# Patient Record
Sex: FEMALE | Race: BLACK OR AFRICAN AMERICAN | NOT HISPANIC OR LATINO | Employment: FULL TIME | URBAN - METROPOLITAN AREA
[De-identification: names, ages, dates, MRNs, and addresses within clinical notes are randomized per-mention and may not be internally consistent; named-entity substitution may affect disease eponyms.]

---

## 2022-04-28 ENCOUNTER — HOSPITAL ENCOUNTER (OUTPATIENT)
Dept: ULTRASOUND IMAGING | Facility: CLINIC | Age: 62
Discharge: HOME | End: 2022-04-28
Attending: FAMILY MEDICINE | Admitting: FAMILY MEDICINE
Payer: COMMERCIAL

## 2022-04-28 DIAGNOSIS — M25.561 ACUTE PAIN OF RIGHT KNEE: ICD-10-CM

## 2022-04-28 PROCEDURE — 93971 EXTREMITY STUDY: CPT | Mod: RT

## 2024-01-20 ENCOUNTER — HEALTH MAINTENANCE LETTER (OUTPATIENT)
Age: 64
End: 2024-01-20

## 2025-01-26 ENCOUNTER — HEALTH MAINTENANCE LETTER (OUTPATIENT)
Age: 65
End: 2025-01-26

## 2025-05-31 ENCOUNTER — OFFICE VISIT (OUTPATIENT)
Dept: URGENT CARE | Facility: URGENT CARE | Age: 65
End: 2025-05-31
Payer: COMMERCIAL

## 2025-05-31 VITALS
SYSTOLIC BLOOD PRESSURE: 158 MMHG | DIASTOLIC BLOOD PRESSURE: 82 MMHG | BODY MASS INDEX: 51.54 KG/M2 | WEIGHT: 273 LBS | RESPIRATION RATE: 17 BRPM | HEIGHT: 61 IN | HEART RATE: 75 BPM | TEMPERATURE: 98.6 F

## 2025-05-31 DIAGNOSIS — L03.213 PERIORBITAL CELLULITIS OF RIGHT EYE: Primary | ICD-10-CM

## 2025-05-31 DIAGNOSIS — R03.0 ELEVATED BLOOD PRESSURE READING IN OFFICE WITHOUT DIAGNOSIS OF HYPERTENSION: ICD-10-CM

## 2025-05-31 PROBLEM — G47.33 OSA (OBSTRUCTIVE SLEEP APNEA): Status: ACTIVE | Noted: 2023-11-19

## 2025-05-31 PROBLEM — M25.561 CHRONIC PAIN OF RIGHT KNEE: Status: ACTIVE | Noted: 2023-11-19

## 2025-05-31 PROBLEM — E11.9 TYPE 2 DIABETES MELLITUS WITHOUT COMPLICATION, WITHOUT LONG-TERM CURRENT USE OF INSULIN (H): Status: ACTIVE | Noted: 2018-03-25

## 2025-05-31 PROBLEM — G89.29 CHRONIC PAIN OF RIGHT KNEE: Status: ACTIVE | Noted: 2023-11-19

## 2025-05-31 PROCEDURE — 3077F SYST BP >= 140 MM HG: CPT | Performed by: NURSE PRACTITIONER

## 2025-05-31 PROCEDURE — 3079F DIAST BP 80-89 MM HG: CPT | Performed by: NURSE PRACTITIONER

## 2025-05-31 PROCEDURE — 99203 OFFICE O/P NEW LOW 30 MIN: CPT | Performed by: NURSE PRACTITIONER

## 2025-05-31 RX ORDER — CEFDINIR 300 MG/1
300 CAPSULE ORAL 2 TIMES DAILY
Qty: 14 CAPSULE | Refills: 0 | Status: SHIPPED | OUTPATIENT
Start: 2025-05-31 | End: 2025-06-07

## 2025-05-31 NOTE — PROGRESS NOTES
Urgent Care Clinic Visit    Chief Complaint   Patient presents with    Eye Problem     Swelling and redness on the right eyelid and around the eye. Been going on for the last few days.                   5/31/2025     9:25 AM   Additional Questions   Roomed by Will Guan MA   Accompanied by Aleyda Cooper

## 2025-05-31 NOTE — PROGRESS NOTES
"Chief Complaint   Patient presents with    Eye Problem     Swelling and redness on the right eyelid and around the eye. Been going on for the last few days.          ICD-10-CM    1. Periorbital cellulitis of right eye  L03.213 cefdinir (OMNICEF) 300 MG capsule      2. Elevated blood pressure reading in office without diagnosis of hypertension  R03.0       Instructed patient to take cefdinir as prescribed.  If symptoms worsen she is instructed to go to the emergency room.  If they fail to improve she should follow-up in 1 week.    Blood pressure is noted to be high in clinic today.  She denies any history of high blood pressure.  Advised her to obtain blood pressure monitor and check for 2 weeks.  If it remains elevated she will follow-up with primary care.  Decrease salt intake and work on weight loss if possible    Red flag warning signs and when to go to the emergency room discussed.  Reviewed potential adverse reactions to medications.      Subjective     Jaimee Watson is an 64 year old female who presents to clinic today for right eye swelling for 3 days.  Today she noticed the swelling is moving down into her cheek this morning.  She denies any visual changes other than the eyelid drooping down, no drainage from the eye no pain in the actual eye but just in the surrounding lids and area.  She denies any fever.  There has been no trauma to the eye.    Objective    BP (!) 158/82   Pulse 75   Temp 98.6  F (37  C) (Oral)   Resp 17   Ht 1.549 m (5' 1\")   Wt 123.8 kg (273 lb)   BMI 51.58 kg/m    Nurses notes and VS have been reviewed.    Physical Exam       GENERAL APPEARANCE: alert, mild distress, and obese     EYES: PERRL and right upper lid is severely edematous occluding 50% of the eye, very red, swelling and erythema extend up into the brow and down toward the zygomatic arch     HENT: oral exam benign, mucus membranes intact, without ulcers or lesions     NECK: no adenopathy or asymmetry, thyroid normal " to palpation     SKIN: no suspicious lesions or rashes     NEURO: Normal strength and tone, mentation intact and speech normal      MICHELLE Gibbs, CNP  [facility]    The use of Dragon/[employer] dictation services may have been used to construct the content in this note; any grammatical or spelling errors are non-intentional. Please contact the author of this note directly if you are in need of any clarification.

## 2025-06-15 ENCOUNTER — HEALTH MAINTENANCE LETTER (OUTPATIENT)
Age: 65
End: 2025-06-15

## 2025-06-17 ENCOUNTER — OFFICE VISIT (OUTPATIENT)
Dept: URGENT CARE | Facility: URGENT CARE | Age: 65
End: 2025-06-17
Payer: COMMERCIAL

## 2025-06-17 VITALS
SYSTOLIC BLOOD PRESSURE: 140 MMHG | DIASTOLIC BLOOD PRESSURE: 60 MMHG | WEIGHT: 277 LBS | BODY MASS INDEX: 52.3 KG/M2 | HEIGHT: 61 IN | HEART RATE: 79 BPM | TEMPERATURE: 97.5 F | OXYGEN SATURATION: 97 %

## 2025-06-17 DIAGNOSIS — T78.40XA ALLERGIC REACTION, INITIAL ENCOUNTER: Primary | ICD-10-CM

## 2025-06-17 PROCEDURE — 3077F SYST BP >= 140 MM HG: CPT | Performed by: FAMILY MEDICINE

## 2025-06-17 PROCEDURE — 3078F DIAST BP <80 MM HG: CPT | Performed by: FAMILY MEDICINE

## 2025-06-17 PROCEDURE — 99214 OFFICE O/P EST MOD 30 MIN: CPT | Performed by: FAMILY MEDICINE

## 2025-06-17 RX ORDER — METHYLPREDNISOLONE 4 MG/1
TABLET ORAL
Qty: 21 TABLET | Refills: 0 | Status: SHIPPED | OUTPATIENT
Start: 2025-06-17 | End: 2025-06-24

## 2025-06-17 RX ORDER — TRIAMCINOLONE ACETONIDE 1 MG/G
CREAM TOPICAL 2 TIMES DAILY
Qty: 30 G | Refills: 0 | Status: SHIPPED | OUTPATIENT
Start: 2025-06-17 | End: 2025-06-27

## 2025-06-17 NOTE — PROGRESS NOTES
Urgent Care Clinic Visit    Chief Complaint   Patient presents with    Allergies     Pt states that she was here 2 weeks ago and the medication provider prescribed for her gave allergy reaction pt has rashes, itching and redness all over her body.              6/17/2025     5:21 PM   Additional Questions   Roomed by kelsey   Accompanied by mom

## 2025-06-17 NOTE — PROGRESS NOTES
"SUBJECTIVE: Jaimee Watson is a 64 year old female presenting with a chief complaint of itchy rash neck/arms and trunk.  Onset of symptoms was 1 week(s) ago.  Course of illness is worsening.      Predisposing factors include noted after 7 days of cefdinir .    No past medical history on file.  Allergies   Allergen Reactions    Cefdinir Rash    Pcn [Penicillins] Itching and Rash     Social History     Tobacco Use    Smoking status: Never    Smokeless tobacco: Not on file   Substance Use Topics    Alcohol use: No       ROS:  SKIN: no rash  GI: no vomiting    OBJECTIVE:  BP (!) 140/60   Pulse 79   Temp 97.5  F (36.4  C) (Tympanic)   Ht 1.55 m (5' 1.02\")   Wt 125.6 kg (277 lb)   SpO2 97%   BMI 52.30 kg/m  GENERAL APPEARANCE: healthy, alert and no distress  EYES: EOMI,  PERRL, conjunctiva clear  SKIN: red rash neck/arms/chest abd      ICD-10-CM    1. Allergic reaction, initial encounter  T78.40XA methylPREDNISolone (MEDROL DOSEPAK) 4 MG tablet therapy pack     triamcinolone (KENALOG) 0.1 % external cream          Fluids/Rest, f/u if worse/not any better    "